# Patient Record
Sex: MALE | Race: BLACK OR AFRICAN AMERICAN | NOT HISPANIC OR LATINO | URBAN - METROPOLITAN AREA
[De-identification: names, ages, dates, MRNs, and addresses within clinical notes are randomized per-mention and may not be internally consistent; named-entity substitution may affect disease eponyms.]

---

## 2020-02-05 ENCOUNTER — EMERGENCY (EMERGENCY)
Facility: HOSPITAL | Age: 45
LOS: 1 days | Discharge: HOME | End: 2020-02-05
Attending: EMERGENCY MEDICINE
Payer: MEDICAID

## 2020-02-05 VITALS — HEIGHT: 74 IN | WEIGHT: 220.02 LBS

## 2020-02-05 DIAGNOSIS — R69 ILLNESS, UNSPECIFIED: ICD-10-CM

## 2020-02-05 DIAGNOSIS — F23 BRIEF PSYCHOTIC DISORDER: ICD-10-CM

## 2020-02-05 LAB
ALBUMIN SERPL ELPH-MCNC: 4.4 G/DL — SIGNIFICANT CHANGE UP (ref 3.3–5.2)
ALP SERPL-CCNC: 63 U/L — SIGNIFICANT CHANGE UP (ref 40–120)
ALT FLD-CCNC: 32 U/L — SIGNIFICANT CHANGE UP
AMPHET UR-MCNC: NEGATIVE — SIGNIFICANT CHANGE UP
ANION GAP SERPL CALC-SCNC: 12 MMOL/L — SIGNIFICANT CHANGE UP (ref 5–17)
ANISOCYTOSIS BLD QL: SLIGHT — SIGNIFICANT CHANGE UP
APAP SERPL-MCNC: <7.5 UG/ML — LOW (ref 10–26)
APPEARANCE UR: CLEAR — SIGNIFICANT CHANGE UP
AST SERPL-CCNC: 29 U/L — SIGNIFICANT CHANGE UP
BACTERIA # UR AUTO: NEGATIVE — SIGNIFICANT CHANGE UP
BARBITURATES UR SCN-MCNC: NEGATIVE — SIGNIFICANT CHANGE UP
BASOPHILS # BLD AUTO: 0 K/UL — SIGNIFICANT CHANGE UP (ref 0–0.2)
BASOPHILS NFR BLD AUTO: 0 % — SIGNIFICANT CHANGE UP (ref 0–2)
BENZODIAZ UR-MCNC: NEGATIVE — SIGNIFICANT CHANGE UP
BILIRUB SERPL-MCNC: 0.6 MG/DL — SIGNIFICANT CHANGE UP (ref 0.4–2)
BILIRUB UR-MCNC: NEGATIVE — SIGNIFICANT CHANGE UP
BUN SERPL-MCNC: 14 MG/DL — SIGNIFICANT CHANGE UP (ref 8–20)
CALCIUM SERPL-MCNC: 9.6 MG/DL — SIGNIFICANT CHANGE UP (ref 8.6–10.2)
CHLORIDE SERPL-SCNC: 103 MMOL/L — SIGNIFICANT CHANGE UP (ref 98–107)
CO2 SERPL-SCNC: 24 MMOL/L — SIGNIFICANT CHANGE UP (ref 22–29)
COCAINE METAB.OTHER UR-MCNC: NEGATIVE — SIGNIFICANT CHANGE UP
COLOR SPEC: YELLOW — SIGNIFICANT CHANGE UP
CREAT SERPL-MCNC: 0.98 MG/DL — SIGNIFICANT CHANGE UP (ref 0.5–1.3)
DIFF PNL FLD: NEGATIVE — SIGNIFICANT CHANGE UP
EOSINOPHIL # BLD AUTO: 0 K/UL — SIGNIFICANT CHANGE UP (ref 0–0.5)
EOSINOPHIL NFR BLD AUTO: 0 % — SIGNIFICANT CHANGE UP (ref 0–6)
EPI CELLS # UR: SIGNIFICANT CHANGE UP
ETHANOL SERPL-MCNC: <10 MG/DL — SIGNIFICANT CHANGE UP
GIANT PLATELETS BLD QL SMEAR: PRESENT — SIGNIFICANT CHANGE UP
GLUCOSE SERPL-MCNC: 100 MG/DL — HIGH (ref 70–99)
GLUCOSE UR QL: NEGATIVE MG/DL — SIGNIFICANT CHANGE UP
HCT VFR BLD CALC: 50 % — SIGNIFICANT CHANGE UP (ref 39–50)
HGB BLD-MCNC: 16.5 G/DL — SIGNIFICANT CHANGE UP (ref 13–17)
KETONES UR-MCNC: NEGATIVE — SIGNIFICANT CHANGE UP
LEUKOCYTE ESTERASE UR-ACNC: NEGATIVE — SIGNIFICANT CHANGE UP
LYMPHOCYTES # BLD AUTO: 1.29 K/UL — SIGNIFICANT CHANGE UP (ref 1–3.3)
LYMPHOCYTES # BLD AUTO: 12.3 % — LOW (ref 13–44)
MACROCYTES BLD QL: SLIGHT — SIGNIFICANT CHANGE UP
MANUAL SMEAR VERIFICATION: SIGNIFICANT CHANGE UP
MCHC RBC-ENTMCNC: 30.3 PG — SIGNIFICANT CHANGE UP (ref 27–34)
MCHC RBC-ENTMCNC: 33 GM/DL — SIGNIFICANT CHANGE UP (ref 32–36)
MCV RBC AUTO: 91.9 FL — SIGNIFICANT CHANGE UP (ref 80–100)
METHADONE UR-MCNC: NEGATIVE — SIGNIFICANT CHANGE UP
MICROCYTES BLD QL: SLIGHT — SIGNIFICANT CHANGE UP
MONOCYTES # BLD AUTO: 0.46 K/UL — SIGNIFICANT CHANGE UP (ref 0–0.9)
MONOCYTES NFR BLD AUTO: 4.4 % — SIGNIFICANT CHANGE UP (ref 2–14)
NEUTROPHILS # BLD AUTO: 7.82 K/UL — HIGH (ref 1.8–7.4)
NEUTROPHILS NFR BLD AUTO: 74.5 % — SIGNIFICANT CHANGE UP (ref 43–77)
NITRITE UR-MCNC: NEGATIVE — SIGNIFICANT CHANGE UP
OPIATES UR-MCNC: NEGATIVE — SIGNIFICANT CHANGE UP
PCP SPEC-MCNC: SIGNIFICANT CHANGE UP
PCP UR-MCNC: NEGATIVE — SIGNIFICANT CHANGE UP
PH UR: 5 — SIGNIFICANT CHANGE UP (ref 5–8)
PLAT MORPH BLD: ABNORMAL
PLATELET # BLD AUTO: 156 K/UL — SIGNIFICANT CHANGE UP (ref 150–400)
PLATELET COUNT - ESTIMATE: NORMAL — SIGNIFICANT CHANGE UP
POIKILOCYTOSIS BLD QL AUTO: SLIGHT — SIGNIFICANT CHANGE UP
POLYCHROMASIA BLD QL SMEAR: SLIGHT — SIGNIFICANT CHANGE UP
POTASSIUM SERPL-MCNC: 4.2 MMOL/L — SIGNIFICANT CHANGE UP (ref 3.5–5.3)
POTASSIUM SERPL-SCNC: 4.2 MMOL/L — SIGNIFICANT CHANGE UP (ref 3.5–5.3)
PROT SERPL-MCNC: 7 G/DL — SIGNIFICANT CHANGE UP (ref 6.6–8.7)
PROT UR-MCNC: 100 MG/DL
RBC # BLD: 5.44 M/UL — SIGNIFICANT CHANGE UP (ref 4.2–5.8)
RBC # FLD: 12.6 % — SIGNIFICANT CHANGE UP (ref 10.3–14.5)
RBC BLD AUTO: ABNORMAL
RBC CASTS # UR COMP ASSIST: SIGNIFICANT CHANGE UP /HPF (ref 0–4)
SALICYLATES SERPL-MCNC: <0.6 MG/DL — LOW (ref 10–20)
SODIUM SERPL-SCNC: 139 MMOL/L — SIGNIFICANT CHANGE UP (ref 135–145)
SP GR SPEC: 1.02 — SIGNIFICANT CHANGE UP (ref 1.01–1.02)
THC UR QL: POSITIVE
UROBILINOGEN FLD QL: NEGATIVE MG/DL — SIGNIFICANT CHANGE UP
VARIANT LYMPHS # BLD: 8.8 % — HIGH (ref 0–6)
WBC # BLD: 10.49 K/UL — SIGNIFICANT CHANGE UP (ref 3.8–10.5)
WBC # FLD AUTO: 10.49 K/UL — SIGNIFICANT CHANGE UP (ref 3.8–10.5)
WBC UR QL: SIGNIFICANT CHANGE UP

## 2020-02-05 PROCEDURE — 93010 ELECTROCARDIOGRAM REPORT: CPT

## 2020-02-05 PROCEDURE — 99285 EMERGENCY DEPT VISIT HI MDM: CPT

## 2020-02-05 NOTE — ED BEHAVIORAL HEALTH ASSESSMENT NOTE - DESCRIPTION
cooperative    T(C): 36.9 (05 Feb 2020 12:19), Max: 36.9 (05 Feb 2020 12:19)  T(F): 98.5 (05 Feb 2020 12:19), Max: 98.5 (05 Feb 2020 12:19)  HR: 71 (05 Feb 2020 12:19) (71 - 72)  BP: 122/76 (05 Feb 2020 12:19) (122/76 - 124/84)  BP(mean): --  ABP: --  ABP(mean): --  RR: 20 (05 Feb 2020 12:19) (18 - 20)  SpO2: 99% (05 Feb 2020 12:19) (99% - 100%) denies homeless, employed in new [employer], has 3 children who live with their mother, patient's mother lives in [place].

## 2020-02-05 NOTE — ED STATDOCS - PROGRESS NOTE DETAILS
Received patient signout from Dr. Oden.  Patient with paranoia not suicidal.  Patient medically cleared pending psych. pt signed out to me from Dr. Silverio at 11pm pending psych admission, was medically cleared, now signing out to Dr. Mckeon pending psych placement

## 2020-02-05 NOTE — ED BEHAVIORAL HEALTH ASSESSMENT NOTE - SUICIDE PROTECTIVE FACTORS
Has future plans/Responsibility to family and others/Identifies reasons for living/Supportive social network of family or friends/Fear of death or the actual act of killing self

## 2020-02-05 NOTE — ED BEHAVIORAL HEALTH ASSESSMENT NOTE - SUMMARY
Pt is a 45 year old AA male undomiciled. Denies past medical history, brought in by self for paranoid delusions. Pt presents anxious and paranoid with thoughts that famous rappers are out to get him. He is not able to reality test and does not feel safe at home. Pt requires psychiatric hospitalization for safety and medication management. He does not have good support at this time for he is living in [facility], and  from hs spouse. Pts mother is supportive however lives in [place]. Pt seen by Dr. Weems who is in agreement to hospitalization.
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
## 2020-02-05 NOTE — ED STATDOCS - ATTENDING CONTRIBUTION TO CARE
I, Reyes Oden, performed the initial face to face bedside interview with this patient regarding history of present illness, review of symptoms and relevant past medical, social and family history.  I completed an independent physical examination.  I was the initial provider who evaluated this patient. I have signed out the follow up of any pending tests (i.e. labs, radiological studies) to the ACP.  I have communicated the patient’s plan of care and disposition with the ACP.  The history, relevant review of systems, past medical and surgical history, medical decision making, and physical examination was documented by the scribe in my presence and I attest to the accuracy of the documentation.

## 2020-02-05 NOTE — ED ADULT NURSE NOTE - DESCRIPTION (FIRST USE, LAST USE, QUANTITY, FREQUENCY, DURATION)
has not drank since December but reports nightly use prior of approximately 2 to 4 beers reports using approximately 2 to 3 times a month last use was last night

## 2020-02-05 NOTE — ED BEHAVIORAL HEALTH NOTE - BEHAVIORAL HEALTH NOTE
Social work note: Social work spoke with BOAZ at [facility]. Beds are available.  Worker faxed and emailed clinicals and legals to [facility]. Worker called [facility] and they received documents. SW will be called once a decision is made. SW to follow.

## 2020-02-05 NOTE — ED ADULT NURSE NOTE - NS ED BHA BENZODIAZEPINES
Patient states that he will take Ativan 1-2 mg at night as needed for insomnia. Without it he cannot sleep. He has been on this regimen for 15 years.   None known

## 2020-02-05 NOTE — ED STATDOCS - CLINICAL SUMMARY MEDICAL DECISION MAKING FREE TEXT BOX
45 year old male with no PMH presenting to ED c/o anxiety, mild depression. consult psych for further evaluation.

## 2020-02-05 NOTE — ED BEHAVIORAL HEALTH ASSESSMENT NOTE - AXIS IV
Economic problems/Problem related to social environment/Housing problems/Problems with primary support/Occupational problems
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
## 2020-02-05 NOTE — ED BEHAVIORAL HEALTH ASSESSMENT NOTE - HPI (INCLUDE ILLNESS QUALITY, SEVERITY, DURATION, TIMING, CONTEXT, MODIFYING FACTORS, ASSOCIATED SIGNS AND SYMPTOMS)
Patient is a 46 y/o male, undomiciled,  from spouse, 3 dependants who live with their mother, denies PMH, denies previous psychiatric treatment, hospitalizations, violence or suicide attempts, bibs for worsening paranoia and inability to sleep.  Patient reports he has been paranoid x 2weeks. Several years ago patient had his cards read and feels rappers who were present during reading are now out to get him. Patient reports these " famous rappers" know where he is at all times and follow him. Patient feels rappers are out to get him and he does not feel safe. Two weeks ago an unknown female he met at a gas station gave him a pamphlet containing information regarding " who was behind all the evil in the world." Patient reports he has been followed by these rappers ever since. Patient also feels rappers are able to get into his head and read his thoughts. He denies any thoughts of hurting self and others and states " I just want them to go away." Patient is  x18 years and previously lived with his wife, children and in-laws. For the past 7 months patient has been  from his spouse and has been living in [facility] financially supported by mother and spouse. Patient reports he is only able to sleep several hours night with decreased appetite. He denies current or recent thoughts of hurting self or others. 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 Patient reports past h/o of ETOH abuse however states he has not had an alcoholic beverage in over 6 months. Patient denies h/o of complicated withdrawal or withdrawal seizures. Patient reports h/o of Cannabis 1-2x monthly ( last used yesterday).   Received collateral form spouse who reports patient has been anxious and paranoid for several weeks. Last evening patient stayed with his spouse because he did not feel safe at nearby hotel. Patient told spouse he drank a potion several years ago while he was having a psychic read his cards. He feels he was given "poison" in the potion which is why rappers are out to get him.

## 2020-02-05 NOTE — ED STATDOCS - OBJECTIVE STATEMENT
45 year old male with no PMH presenting to ED c/o anxiety, mild depression. States "I feel like people are watching me and trying to catch him. I also feel like there are voices listening to my thoughts." Pt reports he has been feeling like this having a while ever since him and his wife had marriage complications in June 2019. Patient smoked marijuana yesterday because he felt like it could help him.

## 2020-02-05 NOTE — ED ADULT NURSE REASSESSMENT NOTE - DESCRIPTION
received in room awake alert.  requesting results of labs made aware. no c/o at this time.  plan of care discussed and aware.  to be transferred aware if no bed available to remain over night and sw tim work omn transfer in am. pt aware.  pt cooperative talking on phone. tim continue to monitor safety maintained

## 2020-02-05 NOTE — ED ADULT NURSE NOTE - OBJECTIVE STATEMENT
Patient presented in  area seeking treatment for anxiety.  Patient speech is clear with a low tone.  Patient endorses he has been overly focused on information he got from a women on the street about evil and about "rappers I use to listen to and know" that he doesn't want to think about anymore.  Patient denies suicidal or homicidal ideations.  Patient denies any past or present psychiatric treatment.  Patient reports he is currently supported from his wife but they are supportive of each other and he hopes to reunite with her and children.  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  Patient reports he smoked marijuana last night and uses it approximately three times a month.  Patient reports no alcohol use since December and before that would have 2 to 4 beers nightly but denies ever having withdrawal or needing treatment.  Denies any medical problems.

## 2020-02-05 NOTE — ED ADULT TRIAGE NOTE - CHIEF COMPLAINT QUOTE
Patient arrived ambulatory to ED, awake, alert, and oriented times 3, breathing unlabored.  patient states " I feel very anxious, and scared"  Symptoms present for weeks as per patient.  Denies SI/HI.  Denies drugs or alcohol today.  Smoked Marijuana last night

## 2020-02-06 ENCOUNTER — INPATIENT (INPATIENT)
Facility: HOSPITAL | Age: 45
LOS: 5 days | Discharge: HOME | End: 2020-02-12
Attending: PSYCHIATRY & NEUROLOGY | Admitting: PSYCHIATRY & NEUROLOGY
Payer: MEDICAID

## 2020-02-06 VITALS
RESPIRATION RATE: 18 BRPM | SYSTOLIC BLOOD PRESSURE: 110 MMHG | TEMPERATURE: 98 F | DIASTOLIC BLOOD PRESSURE: 76 MMHG | OXYGEN SATURATION: 98 % | HEART RATE: 65 BPM

## 2020-02-06 VITALS — TEMPERATURE: 98 F | WEIGHT: 220.46 LBS | HEIGHT: 74 IN

## 2020-02-06 DIAGNOSIS — F23 BRIEF PSYCHOTIC DISORDER: ICD-10-CM

## 2020-02-06 PROCEDURE — 85027 COMPLETE CBC AUTOMATED: CPT

## 2020-02-06 PROCEDURE — 80053 COMPREHEN METABOLIC PANEL: CPT

## 2020-02-06 PROCEDURE — 70450 CT HEAD/BRAIN W/O DYE: CPT

## 2020-02-06 PROCEDURE — 99222 1ST HOSP IP/OBS MODERATE 55: CPT

## 2020-02-06 PROCEDURE — 36415 COLL VENOUS BLD VENIPUNCTURE: CPT

## 2020-02-06 PROCEDURE — 81001 URINALYSIS AUTO W/SCOPE: CPT

## 2020-02-06 PROCEDURE — 80307 DRUG TEST PRSMV CHEM ANLYZR: CPT

## 2020-02-06 PROCEDURE — 99284 EMERGENCY DEPT VISIT MOD MDM: CPT

## 2020-02-06 PROCEDURE — 70450 CT HEAD/BRAIN W/O DYE: CPT | Mod: 26

## 2020-02-06 PROCEDURE — 99283 EMERGENCY DEPT VISIT LOW MDM: CPT

## 2020-02-06 PROCEDURE — 93005 ELECTROCARDIOGRAM TRACING: CPT

## 2020-02-06 RX ORDER — DULOXETINE HYDROCHLORIDE 30 MG/1
20 CAPSULE, DELAYED RELEASE ORAL DAILY
Refills: 0 | Status: DISCONTINUED | OUTPATIENT
Start: 2020-02-06 | End: 2020-02-11

## 2020-02-06 RX ORDER — HALOPERIDOL DECANOATE 100 MG/ML
5 INJECTION INTRAMUSCULAR EVERY 6 HOURS
Refills: 0 | Status: DISCONTINUED | OUTPATIENT
Start: 2020-02-06 | End: 2020-02-06

## 2020-02-06 RX ORDER — ZOLPIDEM TARTRATE 10 MG/1
5 TABLET ORAL ONCE
Refills: 0 | Status: COMPLETED | OUTPATIENT
Start: 2020-02-06 | End: 2020-02-06

## 2020-02-06 RX ORDER — HALOPERIDOL DECANOATE 100 MG/ML
5 INJECTION INTRAMUSCULAR ONCE
Refills: 0 | Status: DISCONTINUED | OUTPATIENT
Start: 2020-02-06 | End: 2020-02-06

## 2020-02-06 RX ORDER — LANOLIN ALCOHOL/MO/W.PET/CERES
3 CREAM (GRAM) TOPICAL
Refills: 0 | Status: DISCONTINUED | OUTPATIENT
Start: 2020-02-06 | End: 2020-02-10

## 2020-02-06 RX ORDER — DIPHENHYDRAMINE HCL 50 MG
50 CAPSULE ORAL EVERY 6 HOURS
Refills: 0 | Status: DISCONTINUED | OUTPATIENT
Start: 2020-02-06 | End: 2020-02-12

## 2020-02-06 RX ORDER — DIPHENHYDRAMINE HCL 50 MG
50 CAPSULE ORAL ONCE
Refills: 0 | Status: DISCONTINUED | OUTPATIENT
Start: 2020-02-06 | End: 2020-02-12

## 2020-02-06 RX ORDER — TRAZODONE HCL 50 MG
50 TABLET ORAL AT BEDTIME
Refills: 0 | Status: DISCONTINUED | OUTPATIENT
Start: 2020-02-06 | End: 2020-02-12

## 2020-02-06 RX ADMIN — Medication 3 MILLIGRAM(S): at 22:39

## 2020-02-06 NOTE — ED BEHAVIORAL HEALTH NOTE - BEHAVIORAL HEALTH NOTE
SW Note: Pt has been accepted to [facility] for inpt psychiatric care. [address] [place]. Accepting MD, Dr. Mendelowitz. 9.37 legals completed and faxed to [facility] intake. Pt notified and he called his family and gave info. Ambulance arranged with [facility]. Pt uninsured.

## 2020-02-06 NOTE — CHART NOTE - NSCHARTNOTEFT_GEN_A_CORE
Screening Medical Evaluation  Patient Admitted from: [facility] ER    [facility] admitting diagnosis: Brief Pyschotic Disorder    PAST MEDICAL & SURGICAL HISTORY:  No PMHx  No SHx    Allergies    No Known Allergies    Intolerances        Social History:   Denies smoking or alcohol use  Used to smoke marijuana everyday, last time smoking was on , states he never wants to smoke again due to anxiety.     FAMILY HISTORY:      MEDICATIONS  (STANDING):  melatonin. 3 milliGRAM(s) Oral <User Schedule>    MEDICATIONS  (PRN):  diphenhydrAMINE 50 milliGRAM(s) Oral every 6 hours PRN eps prophylaxis  diphenhydrAMINE   Injectable 50 milliGRAM(s) IntraMuscular once PRN eps prophylaxis  LORazepam     Tablet 2 milliGRAM(s) Oral every 6 hours PRN agitation  LORazepam   Injectable 2 milliGRAM(s) IntraMuscular once PRN agitation  traZODone 50 milliGRAM(s) Oral at bedtime PRN insomnia      Vital Signs Last 24 Hrs  T(C): 36.8 (2020 18:01), Max: 36.9 (2020 15:45)  T(F): 98.2 (2020 18:01), Max: 98.4 (2020 15:45)  HR: 65 (2020 15:45) (65 - 80)  BP: 110/76 (2020 15:45) (110/76 - 120/85)  RR: 18 (2020 15:45) (18 - 19)  SpO2: 98% (2020 15:45) (98% - 98%)  CAPILLARY BLOOD GLUCOSE            PHYSICAL EXAM:  GENERAL: NAD, well-developed  HEAD:  Atraumatic, Normocephalic  EYES: EOMI, PERRLA, conjunctiva and sclera clear  NECK: Supple, No JVD  CHEST/LUNG: Clear to auscultation bilaterally; No wheeze  HEART: Regular rate and rhythm; No murmurs, rubs, or gallops  ABDOMEN: Soft, Nontender, Nondistended; Bowel sounds present  EXTREMITIES:  2+ Peripheral Pulses, No clubbing, cyanosis, or edema  PSYCH: AAOx3  NEUROLOGY: non-focal  SKIN: No rashes or lesions    LABS:                        16.5   10.49 )-----------( 156      ( 2020 12:09 )             50.0     02-05    139  |  103  |  14.0  ----------------------------<  100<H>  4.2   |  24.0  |  0.98    Ca    9.6      2020 12:09    TPro  7.0  /  Alb  4.4  /  TBili  0.6  /  DBili  x   /  AST  29  /  ALT  32  /  AlkPhos  63  02-05          Urinalysis Basic - ( 2020 12:39 )    Color: Yellow / Appearance: Clear / S.025 / pH: x  Gluc: x / Ketone: Negative  / Bili: Negative / Urobili: Negative mg/dL   Blood: x / Protein: 100 mg/dL / Nitrite: Negative   Leuk Esterase: Negative / RBC: 0-2 /HPF / WBC 0-2   Sq Epi: x / Non Sq Epi: Occasional / Bacteria: Negative        Assessment and Plan:  46 y/o male no PMHx presents to [facility] with an episode of Brief Psychotic Disorder. Pt denies any chest pain, palpitations, SOB, headaches, dizziness, N/V/D, abdominal pain, constipation, dysuria, blurry vision. Pt states he does not want to smoke marijuana again due to the anxiety it caused. Pt has no complaints at this time.     1. Brief Psychotic Disorder- will continue meds as per pysch team

## 2020-02-06 NOTE — ED ADULT NURSE REASSESSMENT NOTE - COMFORT CARE
po fluids offered
meal provided/plan of care explained
plan of care explained

## 2020-02-06 NOTE — ED BEHAVIORAL HEALTH NOTE - BEHAVIORAL HEALTH NOTE
Please refer to psychiatric evaluation note of 2/5/20. Patient was anxious but cooperative overnight. He is willing for admission to begin restore his life with his family.  from his wife approximately 8 months.   Alert, oriented x 4. Mood is dysphoric. Affect is anxious. Speech is normal tone and flow. Average intelligence. Attention is impaired. No psychomotor abnormalities. No delusions elicited. Insight limited and judgment fair.  Thoughts are goal directed. Denies hallucinations. Cognition is intact. No apparent memory or language impairment.                        16.5   10.49 )-----------( 156      ( 05 Feb 2020 12:09 )             50.0   02-05    139  |  103  |  14.0  ----------------------------<  100<H>  4.2   |  24.0  |  0.98    Ca    9.6      05 Feb 2020 12:09    TPro  7.0  /  Alb  4.4  /  TBili  0.6  /  DBili  x   /  AST  29  /  ALT  32  /  AlkPhos  63  02-05    Axis 1 Major Depression, Severe, recurrent    Plan: transfer to inpatient when bed becomes available. Start Cymbalta 20mg po bid    Kristin Perera NP

## 2020-02-06 NOTE — ED ADULT NURSE REASSESSMENT NOTE - NS ED NURSE REASSESS COMMENT FT1
awake ro desk c/o room feeling cold blanket provide  called
pt awake unable to sleep spoke with dr lorenzo to  place ordered
pt changed his mind no longer wants medication for sleep
Assumed care of patient at 0715.  Patient remains superficial during interactions.  Patient awake out of his room, contacted family via phone.  No attempts to harm self or others and safety maintained.
Nurse to nurse report called to Tamanna JACOBSON on 2 [facility] at [facility].  Patient educated about plan of care and pending transfer.  No attempts to harm self or others and safety maintained.
Patient ate 100% of his dinner.  Patient superficial and guarded during interaction.  Patient withdrawn to his room, laying in bed awake.  No attempts to harm self or others.  Patient educated about plan of care and reassured of his safety.
Patient educated about ordered medication and provided education about med but refusing medication at this time.  Patient ate fruit for breakfast.  Patient cooperating with ordered testing.  No attempts to harm self or others and safety maintained.
Patient educated about plan of care and agrees with plan.  Patient remains anxious.  Patient contacting family for support.  Safety of patient maintained.

## 2020-02-06 NOTE — ED ADULT NURSE REASSESSMENT NOTE - GENERAL PATIENT STATE
resting/sleeping
comfortable appearance/cooperative
cooperative
cooperative/anxious
cooperative/comfortable appearance

## 2020-02-07 LAB
CHOLEST SERPL-MCNC: 151 MG/DL — SIGNIFICANT CHANGE UP (ref 120–199)
HBA1C BLD-MCNC: 5.3 % — SIGNIFICANT CHANGE UP (ref 4–5.6)
HDLC SERPL-MCNC: 54 MG/DL — SIGNIFICANT CHANGE UP (ref 35–55)
LIPID PNL WITH DIRECT LDL SERPL: 86 MG/DL — SIGNIFICANT CHANGE UP
TRIGL SERPL-MCNC: 82 MG/DL — SIGNIFICANT CHANGE UP (ref 10–149)
TSH SERPL-MCNC: 3.38 UIU/ML — SIGNIFICANT CHANGE UP (ref 0.27–4.2)

## 2020-02-07 PROCEDURE — 99232 SBSQ HOSP IP/OBS MODERATE 35: CPT | Mod: 25,GC

## 2020-02-07 PROCEDURE — 90853 GROUP PSYCHOTHERAPY: CPT

## 2020-02-07 RX ORDER — ARIPIPRAZOLE 15 MG/1
2 TABLET ORAL AT BEDTIME
Refills: 0 | Status: DISCONTINUED | OUTPATIENT
Start: 2020-02-07 | End: 2020-02-10

## 2020-02-07 RX ADMIN — Medication 3 MILLIGRAM(S): at 21:05

## 2020-02-07 RX ADMIN — ARIPIPRAZOLE 2 MILLIGRAM(S): 15 TABLET ORAL at 21:05

## 2020-02-08 PROCEDURE — 99232 SBSQ HOSP IP/OBS MODERATE 35: CPT

## 2020-02-08 RX ADMIN — ARIPIPRAZOLE 2 MILLIGRAM(S): 15 TABLET ORAL at 20:53

## 2020-02-08 RX ADMIN — Medication 3 MILLIGRAM(S): at 20:53

## 2020-02-09 PROCEDURE — 99232 SBSQ HOSP IP/OBS MODERATE 35: CPT

## 2020-02-09 RX ADMIN — ARIPIPRAZOLE 2 MILLIGRAM(S): 15 TABLET ORAL at 21:26

## 2020-02-09 RX ADMIN — Medication 3 MILLIGRAM(S): at 21:59

## 2020-02-10 PROCEDURE — 99231 SBSQ HOSP IP/OBS SF/LOW 25: CPT | Mod: 25,GC

## 2020-02-10 PROCEDURE — 90853 GROUP PSYCHOTHERAPY: CPT

## 2020-02-10 RX ORDER — LANOLIN ALCOHOL/MO/W.PET/CERES
3 CREAM (GRAM) TOPICAL AT BEDTIME
Refills: 0 | Status: DISCONTINUED | OUTPATIENT
Start: 2020-02-10 | End: 2020-02-12

## 2020-02-10 RX ORDER — ARIPIPRAZOLE 15 MG/1
5 TABLET ORAL DAILY
Refills: 0 | Status: DISCONTINUED | OUTPATIENT
Start: 2020-02-10 | End: 2020-02-12

## 2020-02-10 RX ORDER — ARIPIPRAZOLE 15 MG/1
5 TABLET ORAL ONCE
Refills: 0 | Status: COMPLETED | OUTPATIENT
Start: 2020-02-10 | End: 2020-02-10

## 2020-02-10 RX ADMIN — ARIPIPRAZOLE 5 MILLIGRAM(S): 15 TABLET ORAL at 10:31

## 2020-02-10 RX ADMIN — Medication 3 MILLIGRAM(S): at 21:58

## 2020-02-11 PROCEDURE — 99231 SBSQ HOSP IP/OBS SF/LOW 25: CPT | Mod: 25

## 2020-02-11 PROCEDURE — 90853 GROUP PSYCHOTHERAPY: CPT

## 2020-02-11 RX ORDER — ARIPIPRAZOLE 15 MG/1
1 TABLET ORAL
Qty: 14 | Refills: 0
Start: 2020-02-11 | End: 2020-02-24

## 2020-02-11 RX ORDER — ARIPIPRAZOLE 15 MG/1
1 TABLET ORAL
Qty: 30 | Refills: 0
Start: 2020-02-11 | End: 2020-03-11

## 2020-02-11 RX ADMIN — Medication 3 MILLIGRAM(S): at 21:30

## 2020-02-11 RX ADMIN — ARIPIPRAZOLE 5 MILLIGRAM(S): 15 TABLET ORAL at 08:20

## 2020-02-12 VITALS — DIASTOLIC BLOOD PRESSURE: 74 MMHG | TEMPERATURE: 98 F | SYSTOLIC BLOOD PRESSURE: 112 MMHG | HEART RATE: 83 BPM

## 2020-02-12 PROCEDURE — 99238 HOSP IP/OBS DSCHRG MGMT 30/<: CPT | Mod: GC

## 2020-02-12 RX ADMIN — ARIPIPRAZOLE 5 MILLIGRAM(S): 15 TABLET ORAL at 08:45

## 2021-06-07 NOTE — ED ADULT NURSE NOTE - CHIEF COMPLAINT QUOTE
Patient arrived ambulatory to ED, awake, alert, and oriented times 3, breathing unlabored.  patient states " I feel very anxious, and scared"  Symptoms present for weeks as per patient.  Denies SI/HI.  Denies drugs or alcohol today.  Smoked Marijuana last night RN cannot approve Refill Request    RN can NOT refill this medication Protocol failed and NO refill given.       [place], [facility] Triage/Med Refill 4/20/2020    Requested Prescriptions   Pending Prescriptions Disp Refills     venlafaxine (EFFEXOR-XR) 37.5 MG 24 hr capsule [Pharmacy Med Name: VENLAFAXINE HCL ER 37.5 MG CAP] 90 capsule 3     Sig: TAKE 1 CAPSULE BY MOUTH EVERY DAY       Venlafaxine/Desvenlafaxine Refill Protocol Failed - 4/19/2020  9:12 AM        Failed - LFT or AST or ALT in last year     Albumin   Date Value Ref Range Status   10/01/2018 4.2 3.5 - 5.0 g/dL Final     Bilirubin, Total   Date Value Ref Range Status   10/01/2018 2.0 (H) 0.0 - 1.0 mg/dL Final     Alkaline Phosphatase   Date Value Ref Range Status   10/01/2018 32 (L) 45 - 120 U/L Final     AST   Date Value Ref Range Status   10/01/2018 12 0 - 40 U/L Final     ALT   Date Value Ref Range Status   10/01/2018 11 0 - 45 U/L Final     Protein, Total   Date Value Ref Range Status   10/01/2018 7.1 6.0 - 8.0 g/dL Final                Failed - Fasting lipid cascade in last year     Cholesterol   Date Value Ref Range Status   04/04/2019 172 <=199 mg/dL Final     Triglycerides   Date Value Ref Range Status   04/04/2019 50 <=149 mg/dL Final     HDL Cholesterol   Date Value Ref Range Status   04/04/2019 87 >=50 mg/dL Final     LDL Calculated   Date Value Ref Range Status   04/04/2019 75 <=129 mg/dL Final     Patient Fasting > 8hrs?   Date Value Ref Range Status   04/04/2019 Yes  Final   04/04/2019 Yes  Final             Passed - PCP or prescribing provider visit in last year     Last office visit with prescriber/PCP: 1/27/2020 Catarino Earl MD OR same dept: 1/27/2020 Catarino Earl MD OR same specialty: 1/27/2020 Catarino Earl MD  Last physical: Visit date not found Last MTM visit: Visit date not found   Next visit within 3 mo: Visit date not found  Next physical within 3 mo: Visit date not  found  Prescriber OR PCP: Catarino Walker MD  Last diagnosis associated with med order: 1. Anxiety  - venlafaxine (EFFEXOR-XR) 37.5 MG 24 hr capsule [Pharmacy Med Name: VENLAFAXINE HCL ER 37.5 MG CAP]; TAKE 1 CAPSULE BY MOUTH EVERY DAY  Dispense: 90 capsule; Refill: 0    If protocol passes may refill for 12 months if within 3 months of last provider visit (or a total of 15 months).             Passed - Blood Pressure in last year     BP Readings from Last 1 Encounters:   01/27/20 100/58

## 2022-05-30 ENCOUNTER — EMERGENCY (EMERGENCY)
Facility: HOSPITAL | Age: 47
LOS: 1 days | Discharge: HOME | End: 2022-05-30
Attending: EMERGENCY MEDICINE
Payer: MEDICAID

## 2022-05-30 VITALS
SYSTOLIC BLOOD PRESSURE: 135 MMHG | OXYGEN SATURATION: 97 % | RESPIRATION RATE: 20 BRPM | HEIGHT: 74 IN | HEART RATE: 94 BPM | DIASTOLIC BLOOD PRESSURE: 94 MMHG | TEMPERATURE: 98 F | WEIGHT: 244.05 LBS

## 2022-05-30 DIAGNOSIS — F39 UNSPECIFIED MOOD [AFFECTIVE] DISORDER: ICD-10-CM

## 2022-05-30 DIAGNOSIS — F63.9 IMPULSE DISORDER, UNSPECIFIED: ICD-10-CM

## 2022-05-30 PROCEDURE — 12001 RPR S/N/AX/GEN/TRNK 2.5CM/<: CPT

## 2022-05-30 PROCEDURE — 99284 EMERGENCY DEPT VISIT MOD MDM: CPT | Mod: 25

## 2022-05-30 PROCEDURE — 90792 PSYCH DIAG EVAL W/MED SRVCS: CPT

## 2022-05-30 PROCEDURE — 99285 EMERGENCY DEPT VISIT HI MDM: CPT | Mod: 25

## 2022-05-30 PROCEDURE — 12011 RPR F/E/E/N/L/M 2.5 CM/<: CPT | Mod: 59

## 2022-05-30 PROCEDURE — 12011 RPR F/E/E/N/L/M 2.5 CM/<: CPT | Mod: XU
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
## 2022-05-30 NOTE — ED PROCEDURE NOTE - NS_ ATTENDINGSCRIBEDETAILS _ED_A_ED_FT
This scribe's documentation has been prepared under my direction and personally reviewed by me in its entirety. I confirm that the note above accurately reflects all work, treatment, procedures, and medical decision making performed by me.

## 2022-05-30 NOTE — ED PROCEDURE NOTE - CPROC ED POST PROC CARE GUIDE1
Verbal/written post procedure instructions were given to patient/caregiver./Instructed patient/caregiver to follow-up with primary care physician./Instructed patient/caregiver regarding signs and symptoms of infection.
Verbal/written post procedure instructions were given to patient/caregiver./Instructed patient/caregiver regarding signs and symptoms of infection.
Verbal/written post procedure instructions were given to patient/caregiver./Instructed patient/caregiver to follow-up with primary care physician./Instructed patient/caregiver regarding signs and symptoms of infection.

## 2022-05-30 NOTE — ED PROVIDER NOTE - NSFOLLOWUPINSTRUCTIONS_ED_ALL_ED_FT
- Follow up with your doctor within 3-4 days.   -- Keep sutures dry for 1 day, then clean with soap/water, apply bacitracin once daily.  -- Return to ED for suture removal in 5-7 days.   -- Any increased pain, redness, streaking (red lines), swelling, fever, chills please immediately return to ER.     Laceration    A laceration is a cut that goes through all of the layers of the skin and into the tissue that is right under the skin. Some lacerations heal on their own. Others need to be closed with skin adhesive strips, skin glue, stitches (sutures), or staples. Proper laceration care minimizes the risk of infection and helps the laceration to heal better.  If non-absorbable stitches or staples have been placed, they must be taken out within the time frame instructed by your healthcare provider.    SEEK IMMEDIATE MEDICAL CARE IF YOU HAVE ANY OF THE FOLLOWING SYMPTOMS: swelling around the wound, worsening pain, drainage from the wound, red streaking going away from your wound, inability to move finger or toe near the laceration, or discoloration of skin near the laceration.
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
## 2022-05-30 NOTE — ED PROCEDURE NOTE - NS_ATTENDINGSCRIBE_ED_ALL_ED
I personally performed the service described in the documentation recorded by the scribe in my presence, and it accurately and completely records my words and actions.

## 2022-05-30 NOTE — ED PROVIDER NOTE - SECONDARY DIAGNOSIS.
"----- Message from Coni Cee MD sent at 9/15/2020  4:31 PM CDT -----  I'd like to see him back in my office before making a judgement call on his symptoms  If it's vascular Pdism I may need to push carbidopa/levodopa 25/100mg higher  ----- Message -----  From: Power Benton DO  Sent: 9/14/2020   2:06 PM CDT  To: Coni Cee MD    I was hoping there would be some change with sinemet. He states there has not been any. Had another episode of waking from sleep "fighting" Still difficulty stopping ambulation once started. Was seen in ED for left arm weakness. They thought it was a pinched nerve. MRI unremarkable. Issue resolved.     Wondering if we should have seen some improvement with med at this point if that was any parkinsonian component. He is taking 25/100 TID.    Do you have any thoughts on the next step.       "
Neurologist states that she would like to see before making a judgment on increasing the Sinemet.  I asked if you could have an earlier appointment.  Please call to see if patient can get an earlier appointment.  
Facial laceration
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
## 2023-10-26 NOTE — ED ADULT NURSE NOTE - SUICIDE RISK FACTORS
Partial Purse String (Simple) Text: Given the location of the defect and the characteristics of the surrounding skin a purse string closure was deemed most appropriate.  Undermining was performed circumferentially around the surgical defect.  A purse string suture was then placed and tightened. Wound tension only allowed a partial closure of the circular defect. Agitation/Severe Anxiety/Panic

## 2024-04-12 NOTE — ED BEHAVIORAL HEALTH ASSESSMENT NOTE - NS ED BHA REVIEW OF ED CHART VITAL SIGNS REVIEWED
Patient is medically cleared to return to the [facility].    Take your medication as indicated and prescribed. You were given an antibiotic. Make sure you get the prescription filled and take the antibiotics until finished.  Drink plenty of water while taking the antibiotics.  Avoid drinking alcohol or drinks that have caffeine in it while taking antibiotics.       Also use the mupirocin ointment twice a day on the knee abrasions.    For pain:  Tylenol can be taken every 6 hours. Ibuprofen can be taken every 6 hours. It is recommended you alternate the two every three hours.     Example pain medication schedule:  - 9am: Tylenol (500-1000mg)  - 12 pm/noon: Ibuprofen (400-600mg)  - 3pm: Tylenol  - 6pm: Ibuprofen    Maximum dose of tylenol in a 24 hour period is 4000mg.     Please return to the emergency department if you notice any sign of infection such as fever, redness, swelling, discharge from the site.    PLEASE RETURN TO THE EMERGENCY DEPARTMENT IMMEDIATELY for worsening symptoms, white drainage from the wound, redness or streaking, or if you develop any concerning symptoms such as: high fever not relieved by acetaminophen (Tylenol) and/or ibuprofen (Motrin / Advil), chills, shortness of breath, chest pain, feeling of your heart fluttering or racing, persistent nausea and/or vomiting, vomiting up blood, blood in your stool, loss of consciousness, numbness, weakness or tingling in the arms or legs or change in color of the extremities, changes in mental status, persistent headache, blurry vision, loss of bladder / bowel control, unable to follow up with your physician, or other any other care or concern.     Yes

## 2025-04-14 NOTE — ED BEHAVIORAL HEALTH ASSESSMENT NOTE - TELEPSYCHIATRY?
Department of Anesthesiology  Preprocedure Note       Name:  Angie Keating   Age:  34 y.o.  :  1990                                          MRN:  [id number]         Date:  2025      Surgeon: Surgeon(s):  David Velez MD    Procedure: Procedure(s):  ESOPHAGOGASTRODUODENOSCOPY DIAGNOSTIC ONLY    Medications prior to admission:   Prior to Admission medications    Medication Sig Start Date End Date Taking? Authorizing Provider   escitalopram (LEXAPRO) 20 MG tablet Take 1 tablet by mouth daily At night   Yes ProviderAfsaneh MD   methocarbamol (ROBAXIN) 750 MG tablet Take 1 tablet by mouth 4 times daily   Yes ProviderAfsaneh MD   potassium chloride (KLOR-CON M) 20 MEQ extended release tablet Take 1 tablet by mouth 2 times daily 21  Yes Tomy Acuna MD   albuterol sulfate HFA (VENTOLIN HFA) 108 (90 Base) MCG/ACT inhaler Inhale 2 puffs into the lungs 4 times daily as needed for Wheezing 21  Yes Nigel Ocampo MD   albuterol sulfate HFA (PROAIR HFA) 108 (90 Base) MCG/ACT inhaler Use every 4 hours as needed for wheezing 5/3/21  Yes Nigel Ocampo MD   albuterol sulfate HFA (PROAIR HFA) 108 (90 Base) MCG/ACT inhaler Use every 4 hours as needed for shortness of breath 5/3/21  Yes Nigel Ocampo MD   fluticasone (FLONASE) 50 MCG/ACT nasal spray 1 spray by Each Nostril route daily 21  Yes Donnie Marin MD   Dextromethorphan-guaiFENesin (MUCINEX DM)  MG TB12 Take 1 tablet by mouth 2 times daily as needed (nasal congestion) 21  Yes Donnie Marin MD   diclofenac sodium (VOLTAREN) 1 % GEL Apply 2 g topically 4 times daily as needed for Pain (musculoskeletal chest pain) 21  Yes Patricia Shaw PA-C   metoprolol tartrate (LOPRESSOR) 50 MG tablet Take 1 tablet by mouth 2 times daily 21  Yes Patricia Shaw PA-C   polyethylene glycol (MIRALAX) 17 g PACK packet Take 17 g by mouth daily as needed   Yes Provider, MD Afsaneh   magnesium 30  No